# Patient Record
Sex: MALE | Race: WHITE | HISPANIC OR LATINO
[De-identification: names, ages, dates, MRNs, and addresses within clinical notes are randomized per-mention and may not be internally consistent; named-entity substitution may affect disease eponyms.]

---

## 2019-06-26 ENCOUNTER — RX ONLY (OUTPATIENT)
Age: 54
Setting detail: RX ONLY
End: 2019-06-26

## 2019-06-26 ENCOUNTER — APPOINTMENT (RX ONLY)
Dept: URBAN - METROPOLITAN AREA CLINIC 157 | Facility: CLINIC | Age: 54
Setting detail: DERMATOLOGY
End: 2019-06-26

## 2019-06-26 DIAGNOSIS — I87.2 VENOUS INSUFFICIENCY (CHRONIC) (PERIPHERAL): ICD-10-CM

## 2019-06-26 PROCEDURE — ? PATIENT SPECIFIC COUNSELING

## 2019-06-26 PROCEDURE — 99202 OFFICE O/P NEW SF 15 MIN: CPT

## 2019-06-26 PROCEDURE — ? COUNSELING

## 2019-06-26 PROCEDURE — ? TREATMENT REGIMEN

## 2019-06-26 RX ORDER — TRIAMCINOLONE ACETONIDE 1 MG/G
OINTMENT TOPICAL
Qty: 1 | Refills: 0 | Status: ERX | COMMUNITY
Start: 2019-06-26

## 2019-06-26 ASSESSMENT — LOCATION DETAILED DESCRIPTION DERM
LOCATION DETAILED: RIGHT PROXIMAL PRETIBIAL REGION
LOCATION DETAILED: LEFT PROXIMAL PRETIBIAL REGION

## 2019-06-26 ASSESSMENT — LOCATION SIMPLE DESCRIPTION DERM
LOCATION SIMPLE: RIGHT PRETIBIAL REGION
LOCATION SIMPLE: LEFT PRETIBIAL REGION

## 2019-06-26 ASSESSMENT — LOCATION ZONE DERM: LOCATION ZONE: LEG

## 2019-06-26 NOTE — HPI: RASH
How Severe Is Your Rash?: mild
Is This A New Presentation, Or A Follow-Up?: Rash
Additional History: Patient has had 5 I&D procedures done on his anal abscess and fistula prior to redness onset.

## 2019-06-26 NOTE — PROCEDURE: PATIENT SPECIFIC COUNSELING
Patient reports he is seeing general surgery today at 2 pm to assess for possible recurring anal abscess. Patient was counseled on  condition of stasis dermatitis. Condition is exacerbated by standing (Patient reports he is unable to sit due to pain in the anal region, even with use of donut pillow). Advised patient to discuss pain with general surgery today. If there is no recurring abscess, counseled patient may need to be evaluated by neurology. Discussed possible referral to [facility] in [place] with neurology. \\nCounseled patient on using triamcinolone ointment, compression stockings and elevating legs above the heart and attempting to sit more throughout the day, to alleviate added pressure to the legs. \\nPatient to RTC in 6 weeks, or earlier if condition does not become better. \\nPatient and his wife voiced understanding and agreement to the above plan of care.
Detail Level: Zone

## 2019-06-26 NOTE — PROCEDURE: TREATMENT REGIMEN
Initiate Treatment: Triamcinolone ointment (0.1%) once daily at night X 10 days and then decrease to 2-3 x's per week thereafter for maintenance therapy (advised patient to use plastic wrap on legs with steroid ointment for the first 3 nights).
Detail Level: Zone
Plan: RTC in 6 weeks

## 2019-08-07 ENCOUNTER — APPOINTMENT (RX ONLY)
Dept: URBAN - METROPOLITAN AREA CLINIC 157 | Facility: CLINIC | Age: 54
Setting detail: DERMATOLOGY
End: 2019-08-07

## 2019-08-07 DIAGNOSIS — L90.5 SCAR CONDITIONS AND FIBROSIS OF SKIN: ICD-10-CM

## 2019-08-07 DIAGNOSIS — I87.2 VENOUS INSUFFICIENCY (CHRONIC) (PERIPHERAL): ICD-10-CM | Status: IMPROVED

## 2019-08-07 PROCEDURE — ? OTHER

## 2019-08-07 PROCEDURE — ? PRESCRIPTION

## 2019-08-07 PROCEDURE — ? TREATMENT REGIMEN

## 2019-08-07 PROCEDURE — 99213 OFFICE O/P EST LOW 20 MIN: CPT

## 2019-08-07 PROCEDURE — ? COUNSELING

## 2019-08-07 RX ORDER — TRIAMCINOLONE ACETONIDE 1 MG/G
OINTMENT TOPICAL
Qty: 1 | Refills: 0 | Status: ERX

## 2019-08-07 ASSESSMENT — LOCATION SIMPLE DESCRIPTION DERM
LOCATION SIMPLE: RIGHT PRETIBIAL REGION
LOCATION SIMPLE: BUTTOCK
LOCATION SIMPLE: LEFT PRETIBIAL REGION

## 2019-08-07 ASSESSMENT — LOCATION DETAILED DESCRIPTION DERM
LOCATION DETAILED: RIGHT BUTTOCK
LOCATION DETAILED: RIGHT PROXIMAL PRETIBIAL REGION
LOCATION DETAILED: LEFT PROXIMAL PRETIBIAL REGION

## 2019-08-07 ASSESSMENT — LOCATION ZONE DERM
LOCATION ZONE: TRUNK
LOCATION ZONE: LEG

## 2019-08-07 NOTE — PROCEDURE: OTHER
Other (Free Text): Patient has significant scarring to right buttock, due to multiple I&D’s. Pt was advised to get a donut pillow to help reduce pain while trying to sit down. Patient continues to have intermittent “nerve pain” in right lower extremity, he describes it beginning in the right buttocks and moving down the leg. Patient reports he does not have a PCP. I advised patient today we will send referral to [facility] for him to be assessed by neurologist, due to continued pain and having difficulty sitting. Further management will be given pending this referral. Patient and wife voiced understanding and agreement to plan.
Note Text (......Xxx Chief Complaint.): This diagnosis correlates with the
Detail Level: Zone

## 2019-08-07 NOTE — PROCEDURE: TREATMENT REGIMEN
Plan: Will be referred to [facility] for neuro and/or ortho evaluation. Patient counseled if pain develops in lower legs, one begins to swell, or becomes warm, need to present to ED for evaluation. Patient and wife voiced understanding.
Continue Regimen: TAC ointment once daily (M,W,F and Saturday or Sunday if needed) and elevation twice daily for 30 minutes. Encouraged patient to go to [facility] today to get a donut pillow to assist with sitting, to decrease added strain to right lower extremity. Patient voiced understanding.
Detail Level: Zone